# Patient Record
Sex: FEMALE | Race: BLACK OR AFRICAN AMERICAN | URBAN - METROPOLITAN AREA
[De-identification: names, ages, dates, MRNs, and addresses within clinical notes are randomized per-mention and may not be internally consistent; named-entity substitution may affect disease eponyms.]

---

## 2021-05-20 ENCOUNTER — OFFICE VISIT (OUTPATIENT)
Dept: URBAN - METROPOLITAN AREA CLINIC 30 | Facility: CLINIC | Age: 46
End: 2021-05-20
Payer: COMMERCIAL

## 2021-05-20 DIAGNOSIS — H15.112 EPISCLERITIS PERIODICA FUGAX, LEFT EYE: Primary | ICD-10-CM

## 2021-05-20 PROCEDURE — 99203 OFFICE O/P NEW LOW 30 MIN: CPT | Performed by: OPTOMETRIST

## 2021-05-20 RX ORDER — PREDNISOLONE ACETATE 10 MG/ML
1 % SUSPENSION/ DROPS OPHTHALMIC
Qty: 5 | Refills: 1 | Status: INACTIVE
Start: 2021-05-20 | End: 2021-06-21

## 2021-05-20 RX ORDER — PREDNISOLONE ACETATE 10 MG/ML
1 % SUSPENSION/ DROPS OPHTHALMIC
Qty: 5 | Refills: 1 | Status: INACTIVE
Start: 2021-05-20 | End: 2021-05-20

## 2021-05-20 ASSESSMENT — INTRAOCULAR PRESSURE
OD: 21
OS: 21

## 2021-05-20 NOTE — IMPRESSION/PLAN
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 Plan: Continue Tobramycin as Rx'd by PCP for another 4 days the stop. Start PA 1% QID OS x 1 week.  Rec RTC within ~ 1 month for Diabetic eye exam.

## 2021-06-21 ENCOUNTER — OFFICE VISIT (OUTPATIENT)
Dept: URBAN - METROPOLITAN AREA CLINIC 30 | Facility: CLINIC | Age: 46
End: 2021-06-21
Payer: COMMERCIAL

## 2021-06-21 DIAGNOSIS — H35.411 LATTICE DEGENERATION OF RETINA, RIGHT EYE: ICD-10-CM

## 2021-06-21 DIAGNOSIS — E11.9 TYPE 2 DIABETES MELLITUS W/O COMPLICATION: Primary | ICD-10-CM

## 2021-06-21 DIAGNOSIS — Z79.84 LONG TERM USE OF ORAL ANTIDIABETIC DRUG: ICD-10-CM

## 2021-06-21 PROCEDURE — 92014 COMPRE OPH EXAM EST PT 1/>: CPT | Performed by: OPTOMETRIST

## 2021-06-21 PROCEDURE — 92134 CPTRZ OPH DX IMG PST SGM RTA: CPT | Performed by: OPTOMETRIST

## 2021-06-21 ASSESSMENT — KERATOMETRY
OD: 43.05
OS: 43.02

## 2021-06-21 ASSESSMENT — VISUAL ACUITY
OS: 20/20
OD: 20/20

## 2021-06-21 ASSESSMENT — INTRAOCULAR PRESSURE
OS: 18
OD: 19

## 2021-06-21 NOTE — IMPRESSION/PLAN
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 Plan: Appears to have resolved on exam. Self d/c'd PA 1%, she felt it was giving her a headache.

## 2021-06-21 NOTE — IMPRESSION/PLAN
Impression: Type 2 diabetes mellitus w/o complication: R65.1. Plan: No retinopathy on exam today. Patient educated on importance of BG control. Monitor annually.

## 2021-06-21 NOTE — IMPRESSION/PLAN
Impression: Primary iridocyclitis, bilateral: H20.013. Plan: Viewed post dilation today. Inf KPs OU on exam. Pt educated. Resume PA 1% QID, use OU. Discussed punctal occlusion. Denies Hx of autoimmune condition. Per patient, her sister has autoimmune Hx. Patient is following up c PCP soon. Consider referral to rheumatology. Pt reports one recent episode of numbness in hand and face. She was evaluated for TIA/CVA which was ruled out.

## 2021-06-21 NOTE — IMPRESSION/PLAN
Impression: Lattice degeneration of retina, right eye: H35.411. Plan: Without holes, inferior. Pt educ on findings. Retinas are flat and attached OU. Reviewed s/sx of RD and to call asap if occurs. MAC OCT WNL OU.

## 2021-06-28 ENCOUNTER — OFFICE VISIT (OUTPATIENT)
Dept: URBAN - METROPOLITAN AREA CLINIC 30 | Facility: CLINIC | Age: 46
End: 2021-06-28
Payer: COMMERCIAL

## 2021-06-28 DIAGNOSIS — H20.013 PRIMARY IRIDOCYCLITIS, BILATERAL: Primary | ICD-10-CM

## 2021-06-28 PROCEDURE — 99213 OFFICE O/P EST LOW 20 MIN: CPT | Performed by: OPTOMETRIST

## 2021-06-28 RX ORDER — PREDNISOLONE ACETATE 10 MG/ML
1 % SUSPENSION/ DROPS OPHTHALMIC
Qty: 5 | Refills: 1 | Status: INACTIVE
Start: 2021-06-28 | End: 2021-07-16

## 2021-06-28 ASSESSMENT — INTRAOCULAR PRESSURE
OS: 18
OD: 18

## 2021-06-28 NOTE — IMPRESSION/PLAN
Impression: Primary iridocyclitis, bilateral: H20.013. Plan: Improved today and pt notes she is no longer having headaches. Using PA 1% QID OU. Previously discussed punctal occlusion. Denies Hx of autoimmune condition. Per patient, her sister has sarcoidosis. Patient followed up c PCP since last visit and has been referred to rheumatology. Pt reports one recent episode of numbness in hand and face. She was previously evaluated for TIA/CVA which was ruled out. Will see rheumatology soon. Cont pred QID OU x 4 days then taper to TID OU until next appt in 2 weeks. Call if worsens. Pt educ.

## 2021-07-13 ENCOUNTER — OFFICE VISIT (OUTPATIENT)
Dept: URBAN - METROPOLITAN AREA CLINIC 30 | Facility: CLINIC | Age: 46
End: 2021-07-13
Payer: COMMERCIAL

## 2021-07-13 PROCEDURE — 99213 OFFICE O/P EST LOW 20 MIN: CPT | Performed by: OPTOMETRIST

## 2021-07-13 ASSESSMENT — INTRAOCULAR PRESSURE
OD: 19
OS: 15

## 2021-07-13 NOTE — IMPRESSION/PLAN
Impression: Primary iridocyclitis, bilateral: H20.013. Plan: Patient is on PA 1% taper TID OU. Quiet on exam. Patient reports headaches remain resolved. Previously discussed punctal occlusion. Denies Hx of autoimmune condition. Per patient, her sister has sarcoidosis. Patient followed up c PCP since last visit and has been referred to rheumatology. Pt reports one recent episode of numbness in hand and face. She was previously evaluated for TIA/CVA which was ruled out. Will see rheumatology 8/21 Taper PA 1% starting tomorrow, BID OU x 2 weeks then QD OU for 2 weeks. Contact office if experiences any s/sx of recurrence.

## 2021-08-13 ENCOUNTER — OFFICE VISIT (OUTPATIENT)
Dept: URBAN - METROPOLITAN AREA CLINIC 30 | Facility: CLINIC | Age: 46
End: 2021-08-13
Payer: COMMERCIAL

## 2021-08-13 PROCEDURE — 99213 OFFICE O/P EST LOW 20 MIN: CPT | Performed by: OPTOMETRIST

## 2021-08-13 ASSESSMENT — INTRAOCULAR PRESSURE
OS: 19
OD: 22

## 2021-08-13 NOTE — IMPRESSION/PLAN
Impression: Primary iridocyclitis, bilateral: H20.013. Plan: Patient is on PA 1% taper at QD OU-continue for 1 more week then d/c. Quiet on exam today. Patient reports headaches remain resolved. Denies Hx of autoimmune condition. Per patient, her sister has sarcoidosis. Patient followed up c PCP since last visit and has been referred to rheumatology. Pt reports one recent episode of numbness in hand and face. She was previously evaluated for TIA/CVA which was ruled out. Pt is scheduled to see rheumatology on 8/16/21. Contact office if experiences any s/sx of recurrence.

## 2021-09-17 ENCOUNTER — OFFICE VISIT (OUTPATIENT)
Dept: URBAN - METROPOLITAN AREA CLINIC 30 | Facility: CLINIC | Age: 46
End: 2021-09-17
Payer: COMMERCIAL

## 2021-09-17 PROCEDURE — 99213 OFFICE O/P EST LOW 20 MIN: CPT | Performed by: OPTOMETRIST

## 2021-09-17 RX ORDER — PREDNISOLONE ACETATE 10 MG/ML
1 % SUSPENSION/ DROPS OPHTHALMIC
Qty: 10 | Refills: 1 | Status: INACTIVE
Start: 2021-09-17 | End: 2021-10-29

## 2021-09-17 ASSESSMENT — INTRAOCULAR PRESSURE
OS: 17
OD: 17

## 2021-09-17 NOTE — IMPRESSION/PLAN
Impression: Primary iridocyclitis, bilateral: H20.013. Plan: Patient finished pred course as instructed. She notes she used 1 gtt of pred OD ~ 4 days ago due to eye pain. Patient reports headaches remain resolved. Resume PA 1% BID OU x 3 weeks then reduce to QD OD x3-4 weeks. Denies Hx of autoimmune condition. Per patient, her sister has sarcoidosis. Patient followed up c PCP since last visit and has been referred to rheumatology. Pt reports one recent episode of numbness in hand and face. She was previously evaluated for TIA/CVA which was ruled out. Pt saw rheumatology on 8/16/21-had blood work and will have chest xray soon, will have f/u again. Contact office if experiences any s/sx of recurrence.

## 2021-10-29 ENCOUNTER — OFFICE VISIT (OUTPATIENT)
Dept: URBAN - METROPOLITAN AREA CLINIC 30 | Facility: CLINIC | Age: 46
End: 2021-10-29
Payer: COMMERCIAL

## 2021-10-29 DIAGNOSIS — H04.123 DRY EYE SYNDROME OF BILATERAL LACRIMAL GLANDS: ICD-10-CM

## 2021-10-29 PROCEDURE — 99213 OFFICE O/P EST LOW 20 MIN: CPT | Performed by: OPTOMETRIST

## 2021-10-29 ASSESSMENT — INTRAOCULAR PRESSURE
OS: 16
OD: 18

## 2021-10-29 NOTE — IMPRESSION/PLAN
Impression: Primary iridocyclitis, bilateral: H20.013. Plan: Patient finished pred course-stopped ~10/15/21. Eyes are mildly irritated. Generally quiet today. Patient reports headaches remain resolved. Denies Hx of autoimmune condition. Per patient, her sister has sarcoidosis. Patient followed up c PCP since last visit and has been referred to rheumatology. Pt reports one recent episode of numbness in hand and face. She was previously evaluated for TIA/CVA which was ruled out. Pt saw rheumatology on 8/16/21-had blood work and will have chest xray soon,, had f/u and no findings to explain iritis. Contact office if experiences any s/sx of recurrence.

## 2021-10-29 NOTE — IMPRESSION/PLAN
Impression: Dry eye syndrome of bilateral lacrimal glands: H04.123. Plan: Rec ATs daily and take breaks from computer.

## 2022-08-15 ENCOUNTER — OFFICE VISIT (OUTPATIENT)
Dept: URBAN - METROPOLITAN AREA CLINIC 30 | Facility: CLINIC | Age: 47
End: 2022-08-15
Payer: COMMERCIAL

## 2022-08-15 DIAGNOSIS — H35.411 LATTICE DEGENERATION OF RETINA, RIGHT EYE: Primary | ICD-10-CM

## 2022-08-15 DIAGNOSIS — E11.9 TYPE 2 DIABETES MELLITUS W/O COMPLICATION: ICD-10-CM

## 2022-08-15 DIAGNOSIS — H20.013 PRIMARY IRIDOCYCLITIS, BILATERAL: ICD-10-CM

## 2022-08-15 DIAGNOSIS — Z79.84 LONG TERM USE OF ORAL ANTIDIABETIC DRUG: ICD-10-CM

## 2022-08-15 PROCEDURE — 92134 CPTRZ OPH DX IMG PST SGM RTA: CPT | Performed by: OPTOMETRIST

## 2022-08-15 PROCEDURE — 99213 OFFICE O/P EST LOW 20 MIN: CPT | Performed by: OPTOMETRIST

## 2022-08-15 ASSESSMENT — INTRAOCULAR PRESSURE
OS: 19
OD: 18

## 2022-08-15 ASSESSMENT — KERATOMETRY
OS: 42.91
OD: 42.97

## 2022-08-15 ASSESSMENT — VISUAL ACUITY
OS: 20/20
OD: 20/20

## 2022-08-15 NOTE — IMPRESSION/PLAN
Impression: Primary iridocyclitis, bilateral: H20.013. Plan: Remains quiet. Patient finished pred course-stopped ~10/15/21. Denies Hx of autoimmune condition. Per patient, her sister has sarcoidosis. Pt saw rheumatology on 8/16/21-no conclusive testing, told only to return if new issue occurs. Contact office if experiences any s/sx of recurrence.

## 2022-08-15 NOTE — IMPRESSION/PLAN
Impression: Type 2 diabetes mellitus w/o complication: M51.1. Plan: No retinopathy on exam today. Patient educated on importance of BG control. Monitor annually.